# Patient Record
(demographics unavailable — no encounter records)

---

## 2025-02-26 NOTE — PHYSICAL EXAM
[PERRLA] : OSKAR [S1, S2 Present] : S1, S2 present [Clear to auscultation] : clear to auscultation [Soft] : soft [NonTender] : non tender [Non Distended] : non distended [Normal Bowel Sounds] : normal bowel sounds [No Hepatosplenomegaly] : no hepatosplenomegaly [No Abnormal Lymph Nodes Palpated] : no abnormal lymph nodes palpated [Range Of Motion] : full range of motion [Intact Judgement] : intact judgement  [Insight Insight] : intact insight [Acute distress] : no acute distress [Erythematous Conjunctiva] : nonerythematous conjunctiva [Erythematous Oropharynx] : nonerythematous oropharynx [Lesions] : no lesions [Murmurs] : no murmurs [Joint effusions] : no joint effusions

## 2025-02-26 NOTE — REVIEW OF SYSTEMS
[Oral Ulcers] : oral ulcers [Joint Pains] : arthralgias [Fever] : no fever [Rash] : no rash [Eye Pain] : no eye pain [Redness] : no redness [Blurry Vision] : no blurred vision [Change in Vision] : no change in vision  [Sore Throat] : no sore throat [Earache] : no earache [Nosebleeds] : no epistaxis [Chest Pain] : no chest pain or discomfort [Cough] : no cough [Shortness of Breath] : no shortness of breath [Diarrhea] : no diarrhea [Abdominal Pain] : no abdominal pain [Constipation] : no constipation [Menarche] : no ~T menarche [Joint Swelling] : no joint swelling [Back Pain] : ~T no back pain [AM Stiffness] : no am stiffness [Headache] : no headache [Bruising] : no tendency for easy bruising [Swollen Glands] : no lymphadenopathy [Seasonal Allergies] : no seasonal allergies [Smokers in Home] : no one in home smokes

## 2025-02-26 NOTE — HISTORY OF PRESENT ILLNESS
[FreeTextEntry1] : Labs drawn at [facility] JESUS pos at 1:1280 Leg pains seen by Dr Rios and had X-Rays and labs and was found to have a positive JESUS Spoke to PMD and he suggested rheumatology Leg pains are described by Kristen as like stretching her leg out- pain is on the front of the knees  No swelling, no am stiffness In volleyball and no concerns No limping and no restriction of activities  Pain more at night than in the am and not during school  No associated symptoms